# Patient Record
Sex: FEMALE | Race: WHITE | NOT HISPANIC OR LATINO
[De-identification: names, ages, dates, MRNs, and addresses within clinical notes are randomized per-mention and may not be internally consistent; named-entity substitution may affect disease eponyms.]

---

## 2021-09-15 ENCOUNTER — APPOINTMENT (OUTPATIENT)
Dept: PLASTIC SURGERY | Facility: CLINIC | Age: 61
End: 2021-09-15
Payer: SELF-PAY

## 2021-09-15 VITALS
DIASTOLIC BLOOD PRESSURE: 77 MMHG | SYSTOLIC BLOOD PRESSURE: 127 MMHG | HEART RATE: 71 BPM | OXYGEN SATURATION: 100 % | RESPIRATION RATE: 20 BRPM

## 2021-09-15 DIAGNOSIS — Z87.891 PERSONAL HISTORY OF NICOTINE DEPENDENCE: ICD-10-CM

## 2021-09-15 PROBLEM — Z00.00 ENCOUNTER FOR PREVENTIVE HEALTH EXAMINATION: Status: ACTIVE | Noted: 2021-09-15

## 2021-09-15 PROCEDURE — 99203 OFFICE O/P NEW LOW 30 MIN: CPT | Mod: NC

## 2021-09-15 RX ORDER — ZOLPIDEM TARTRATE 5 MG/1
TABLET, FILM COATED ORAL
Refills: 0 | Status: ACTIVE | COMMUNITY

## 2021-09-15 NOTE — HISTORY OF PRESENT ILLNESS
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  Generally healthy and non smoker- Recently had lower facelift (elsewhere) and did well.  Now bothered by skin excess in the upper and lower eyelids.

## 2021-09-15 NOTE — REASON FOR VISIT
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

## 2022-01-03 RX ORDER — ALPRAZOLAM 0.25 MG/1
0.25 TABLET ORAL
Qty: 2 | Refills: 0 | Status: ACTIVE | COMMUNITY
Start: 2022-01-03 | End: 1900-01-01

## 2022-01-03 RX ORDER — TRAMADOL HYDROCHLORIDE AND ACETAMINOPHEN 37.5; 325 MG/1; MG/1
37.5-325 TABLET, FILM COATED ORAL
Qty: 10 | Refills: 0 | Status: ACTIVE | COMMUNITY
Start: 2022-01-03 | End: 1900-01-01

## 2022-01-03 RX ORDER — LEVOFLOXACIN 500 MG/1
500 TABLET, FILM COATED ORAL DAILY
Qty: 3 | Refills: 0 | Status: ACTIVE | COMMUNITY
Start: 2022-01-03 | End: 1900-01-01

## 2022-01-11 ENCOUNTER — APPOINTMENT (OUTPATIENT)
Dept: PLASTIC SURGERY | Facility: HOSPITAL | Age: 62
End: 2022-01-11

## 2022-01-28 ENCOUNTER — APPOINTMENT (OUTPATIENT)
Dept: PLASTIC SURGERY | Facility: CLINIC | Age: 62
End: 2022-01-28
Payer: SELF-PAY

## 2022-01-28 VITALS
DIASTOLIC BLOOD PRESSURE: 61 MMHG | OXYGEN SATURATION: 96 % | SYSTOLIC BLOOD PRESSURE: 106 MMHG | RESPIRATION RATE: 16 BRPM | HEART RATE: 68 BPM

## 2022-01-28 VITALS
SYSTOLIC BLOOD PRESSURE: 116 MMHG | RESPIRATION RATE: 18 BRPM | HEART RATE: 69 BPM | TEMPERATURE: 98.7 F | OXYGEN SATURATION: 97 % | DIASTOLIC BLOOD PRESSURE: 65 MMHG

## 2022-01-28 VITALS
OXYGEN SATURATION: 99 % | RESPIRATION RATE: 18 BRPM | HEART RATE: 83 BPM | SYSTOLIC BLOOD PRESSURE: 96 MMHG | DIASTOLIC BLOOD PRESSURE: 53 MMHG

## 2022-01-28 VITALS
DIASTOLIC BLOOD PRESSURE: 53 MMHG | SYSTOLIC BLOOD PRESSURE: 96 MMHG | HEART RATE: 59 BPM | RESPIRATION RATE: 18 BRPM | OXYGEN SATURATION: 98 %

## 2022-01-28 VITALS
HEART RATE: 70 BPM | SYSTOLIC BLOOD PRESSURE: 101 MMHG | OXYGEN SATURATION: 97 % | RESPIRATION RATE: 16 BRPM | DIASTOLIC BLOOD PRESSURE: 55 MMHG

## 2022-01-28 PROCEDURE — 15820 BLEPHAROPLASTY LOWER EYELID: CPT | Mod: RT,LT

## 2022-01-28 PROCEDURE — 15822 BLEPHAROPLASTY UPPER EYELID: CPT | Mod: LT,RT

## 2022-01-28 NOTE — PROCEDURE
[FreeTextEntry1] : Dermatochalasia of upper and lower eyelids [FreeTextEntry2] : Bilateral upper and lower blepharoplasty  [FreeTextEntry3] : Xylocaine with epinephrine  [FreeTextEntry4] : 20 cc [FreeTextEntry5] : none  [FreeTextEntry6] : Following sterile prep and drape, upper lid incisions in the tarsal crease and planned skin resection marked out, and in the lower lids subciliary incisions and planned excisions were marked out as well.  RIght upper lid skin ellipse was excised and hemostasis assured with cautery.  Excess skin was excised and hemostasis assured, and wound closed with interrupted and running nylon, and same done on the left upper lid.  Left subciliary incision was made and skin muscle flaps elevated to the orbital rim level.  Hemostasis was assured and skin excess excised and incision closed with running and interrupted nylon sutures, and same down to the right loser lid.  Good shape and contour noted and eyes washed with balanced saline solution and patient monitored for one hour prior to discharge home in good condition.   [FreeTextEntry7] : none

## 2022-01-28 NOTE — REASON FOR VISIT
[Procedure: _________] : a [unfilled] procedure visit [FreeTextEntry1] : Patient returns for bilateral upper and lower blepharoplasty

## 2022-01-28 NOTE — ASSESSMENT
[FreeTextEntry1] : A:\par Dermatochalasia of upper and lower eyelids\par P:\par Bilateral upper and lower blepharoplasty\par Tolerated well \par instructions reviewed

## 2022-01-31 ENCOUNTER — APPOINTMENT (OUTPATIENT)
Dept: PLASTIC SURGERY | Facility: CLINIC | Age: 62
End: 2022-01-31
Payer: SELF-PAY

## 2022-01-31 VITALS
OXYGEN SATURATION: 100 % | TEMPERATURE: 98.5 F | SYSTOLIC BLOOD PRESSURE: 120 MMHG | RESPIRATION RATE: 18 BRPM | DIASTOLIC BLOOD PRESSURE: 68 MMHG | HEART RATE: 74 BPM

## 2022-01-31 PROCEDURE — 99024 POSTOP FOLLOW-UP VISIT: CPT

## 2022-01-31 RX ORDER — KETOROLAC TROMETHAMINE 5 MG/ML
0.5 SOLUTION OPHTHALMIC
Qty: 5 | Refills: 0 | Status: ACTIVE | COMMUNITY
Start: 2022-01-31 | End: 1900-01-01

## 2022-01-31 NOTE — REASON FOR VISIT
[Follow-Up: _____] : a [unfilled] follow-up visit [FreeTextEntry1] : Ms. DELIA SILVERMAN  returns for suture removal, generally doing well with no complaints and no fevers or chills at home, edema and ecchymosis present but improving

## 2022-01-31 NOTE — ASSESSMENT
[FreeTextEntry1] : A:\par Doing well post operative\par P:\par Running sutures removed\par instructions reviewed

## 2022-02-03 ENCOUNTER — APPOINTMENT (OUTPATIENT)
Dept: PLASTIC SURGERY | Facility: CLINIC | Age: 62
End: 2022-02-03
Payer: SELF-PAY

## 2022-02-03 PROCEDURE — 99024 POSTOP FOLLOW-UP VISIT: CPT

## 2022-02-03 NOTE — PHYSICAL EXAM
[de-identified] : Shape and contour are good\par incisions are clean and intact \par edema lower lids

## 2022-02-03 NOTE — ASSESSMENT
[FreeTextEntry1] : A:\par Doing well post operative\par P:\par Typical edema at six days \par Remaining sutures removed\par scar care reviewed \par

## 2022-02-03 NOTE — REASON FOR VISIT
[Post Op: _________] : a [unfilled] post op visit [FreeTextEntry1] : Ms. DELIA SILVERMAN  returns for suture removal, generally doing well with no complaints and no fevers or chills at home.  worried by lateral lowered eyelid

## 2022-02-14 ENCOUNTER — APPOINTMENT (OUTPATIENT)
Dept: PLASTIC SURGERY | Facility: CLINIC | Age: 62
End: 2022-02-14
Payer: SELF-PAY

## 2022-02-14 PROCEDURE — 99024 POSTOP FOLLOW-UP VISIT: CPT

## 2022-02-14 RX ORDER — KETOROLAC TROMETHAMINE 4 MG/ML
0.4 SOLUTION/ DROPS OPHTHALMIC 4 TIMES DAILY
Qty: 1 | Refills: 0 | Status: ACTIVE | COMMUNITY
Start: 2022-02-14 | End: 1900-01-01

## 2022-02-14 NOTE — REASON FOR VISIT
[Post Op: _________] : a [unfilled] post op visit [FreeTextEntry1] : Patient very concerned about edema right lateral eyelid with scleral show

## 2022-02-14 NOTE — ASSESSMENT
[FreeTextEntry1] : A:\par Post operative healing with edema\par Should resolve with time\par P:\par Acular for inflammation\par massage for edema

## 2022-02-14 NOTE — PHYSICAL EXAM
[NI] : Normal [de-identified] : left healing well\par right edema laterally with right lateral scleral show and "pull" on lateral upper lid

## 2022-03-02 ENCOUNTER — APPOINTMENT (OUTPATIENT)
Dept: PLASTIC SURGERY | Facility: CLINIC | Age: 62
End: 2022-03-02
Payer: SELF-PAY

## 2022-03-02 VITALS
SYSTOLIC BLOOD PRESSURE: 144 MMHG | HEART RATE: 77 BPM | DIASTOLIC BLOOD PRESSURE: 68 MMHG | RESPIRATION RATE: 18 BRPM | OXYGEN SATURATION: 98 % | TEMPERATURE: 98.2 F

## 2022-03-02 PROCEDURE — 99024 POSTOP FOLLOW-UP VISIT: CPT

## 2022-03-02 NOTE — REASON FOR VISIT
[Post Op: _________] : a [unfilled] post op visit [FreeTextEntry1] : Patient with right lower lateral chemosis with scleral show

## 2022-03-02 NOTE — ASSESSMENT
[FreeTextEntry1] : A:\par Discussed at length with the patient\par right lateral scleral show with edema and underlying scar\par P:\par This will improve with time and continued conservative management, but as it heals temporary improvement with Kenalog injection to the firm area and 0.2 cc filler (Restylane lift) to the right pre malar area for elevation.  Reviewed the material risks,  benefits,  and alternatives with Ms. DELIA SILVERMAN  , including no surgery, and she  understands and wishes to proceed.\par Patient pre treated with Quadricaine and received 2 mg Kenalog and 0.2 cc Restylane\par Tolerated well\par Will call next week to monitor progress, and repeat injection as indicated

## 2022-03-02 NOTE — PHYSICAL EXAM
[de-identified] : left lower lid edema\par shape and contour improved\par right lateral lower chemosis with scleral show, pull on right lateral upper eyelid \par Firm area under the right lateral lower eyelid 
119.9

## 2022-03-14 ENCOUNTER — APPOINTMENT (OUTPATIENT)
Dept: PLASTIC SURGERY | Facility: CLINIC | Age: 62
End: 2022-03-14
Payer: SELF-PAY

## 2022-03-14 VITALS — HEART RATE: 81 BPM | SYSTOLIC BLOOD PRESSURE: 140 MMHG | DIASTOLIC BLOOD PRESSURE: 68 MMHG

## 2022-03-14 PROCEDURE — 64612 DESTROY NERVE FACE MUSCLE: CPT

## 2022-03-14 PROCEDURE — 99024 POSTOP FOLLOW-UP VISIT: CPT

## 2022-03-14 NOTE — PROCEDURE
[FreeTextEntry1] : Delores orbital rhytids, pre malar hollow  [FreeTextEntry2] : Dysport to forehead and glabella, Restylane to pre malar area [FreeTextEntry3] : none  [FreeTextEntry4] : none  [FreeTextEntry5] : none  [FreeTextEntry6] : Dysport injected in forehead,and glabellar frown lines \par Restylane to pre malar area \par Tolerated well  [FreeTextEntry7] : none

## 2022-03-14 NOTE — HISTORY OF PRESENT ILLNESS
[FreeTextEntry1] : Concerns relate to asymmetry upper eyelid incisions, continued lateral lower lid edema with scleral show, cyst left lateral lower lid incision, and residual rhytids left cheek

## 2022-03-14 NOTE — REASON FOR VISIT
[Post Op: _________] : a [unfilled] post op visit [FreeTextEntry1] : Ms. DELIA SILVERMAN returns for a follow up visit, generally doing well with no complaints and no fevers or chills at home, less right lateral edema and scleral show post Kenalog and massage -

## 2022-03-14 NOTE — PHYSICAL EXAM
[NI] : Normal [de-identified] : right edema resolving, better symmetry than previous visit\par edema with ecchymosis left lower lid\par left lower lid cyst, lateral incision\par bilateral upper lid incisions in tarsal crease \par

## 2022-03-14 NOTE — ASSESSMENT
[FreeTextEntry1] : A:\par Delores orbital rhytids\par pre malar hollow area \par P:\par Dysport to forehead and glabella, Restylane to pre malar area\par Tolerated well \par instructions reviewed

## 2022-03-14 NOTE — ASSESSMENT
[FreeTextEntry1] : A:\par Resolving right lower lid edema with scleral show\par left cyst\par P:\par 0.2 kenalog injected in right lateral lid \par left cystic lesion opened and partially drained\par Continue massage\par

## 2022-03-14 NOTE — REASON FOR VISIT
[Procedure: _________] : a [unfilled] procedure visit [FreeTextEntry1] : Patient presents for treatment of fei orbital rhytids with Dysport, pre malar area with Restylane

## 2022-03-23 ENCOUNTER — APPOINTMENT (OUTPATIENT)
Dept: PLASTIC SURGERY | Facility: CLINIC | Age: 62
End: 2022-03-23
Payer: SELF-PAY

## 2022-03-23 VITALS — HEART RATE: 84 BPM | SYSTOLIC BLOOD PRESSURE: 130 MMHG | RESPIRATION RATE: 20 BRPM | DIASTOLIC BLOOD PRESSURE: 77 MMHG

## 2022-03-23 PROCEDURE — 99024 POSTOP FOLLOW-UP VISIT: CPT | Mod: NC

## 2022-03-23 RX ORDER — LOTEPREDNOL ETABONATE 5 MG/ML
0.5 SUSPENSION/ DROPS OPHTHALMIC 3 TIMES DAILY
Qty: 1 | Refills: 0 | Status: ACTIVE | COMMUNITY
Start: 2022-03-23 | End: 1900-01-01

## 2022-03-23 NOTE — PHYSICAL EXAM
[NI] : Normal [de-identified] : still edematous right lower lid, but softer\par left lateral scar with small cyst\par ecchymosis and edema present

## 2022-03-23 NOTE — ASSESSMENT
[FreeTextEntry1] : A:\par Slow improvement\par P:\par Steroid drops both sides\par drain ed cyst, soaks at home

## 2022-03-31 ENCOUNTER — APPOINTMENT (OUTPATIENT)
Dept: PLASTIC SURGERY | Facility: CLINIC | Age: 62
End: 2022-03-31
Payer: SELF-PAY

## 2022-03-31 PROCEDURE — 99024 POSTOP FOLLOW-UP VISIT: CPT

## 2022-03-31 NOTE — ASSESSMENT
[FreeTextEntry1] : A:\par Continued improvement\par P:\par Low dose steroid on left for inflammation\par Continue drops

## 2022-03-31 NOTE — REASON FOR VISIT
[Follow-Up: _____] : a [unfilled] follow-up visit [FreeTextEntry1] : Ms. DELIA SILVERMAN returns for a follow up visit, generally doing well with no complaints and no fevers or chills at home, ectropion improved- edema on left

## 2022-03-31 NOTE — PHYSICAL EXAM
[NI] : Normal [de-identified] : Shape and contour are improving\par right ectropion resolving\par left edema present \par

## 2022-04-27 ENCOUNTER — APPOINTMENT (OUTPATIENT)
Dept: PLASTIC SURGERY | Facility: CLINIC | Age: 62
End: 2022-04-27
Payer: SELF-PAY

## 2022-04-27 PROCEDURE — 99024 POSTOP FOLLOW-UP VISIT: CPT | Mod: NC

## 2022-04-28 NOTE — REASON FOR VISIT
[Follow-Up: _____] : a [unfilled] follow-up visit [FreeTextEntry1] : 3 months post bilateral upper lower blepharoplasty with ectropion resolved

## 2022-04-28 NOTE — PHYSICAL EXAM
[de-identified] : Ectropion resolved\par right upper lid incision lower than the left \par residual laxity left lower lid skin \par central laxity right lower lid \par

## 2022-04-28 NOTE — HISTORY OF PRESENT ILLNESS
[FreeTextEntry1] : Patient concerns include\par central laxity right lower lid\par residual laxity left lower lid \par Asymmetry upper lid incision on the right side

## 2022-05-25 RX ORDER — ALPRAZOLAM 0.25 MG/1
0.25 TABLET ORAL
Qty: 2 | Refills: 0 | Status: ACTIVE | COMMUNITY
Start: 2022-05-25 | End: 1900-01-01

## 2022-05-25 RX ORDER — OXYCODONE 5 MG/1
5 TABLET ORAL
Qty: 10 | Refills: 0 | Status: ACTIVE | COMMUNITY
Start: 2022-05-25 | End: 1900-01-01

## 2022-05-25 RX ORDER — LEVOFLOXACIN 500 MG/1
500 TABLET, FILM COATED ORAL DAILY
Qty: 3 | Refills: 0 | Status: ACTIVE | COMMUNITY
Start: 2022-05-25 | End: 1900-01-01

## 2022-06-10 ENCOUNTER — APPOINTMENT (OUTPATIENT)
Dept: PLASTIC SURGERY | Facility: CLINIC | Age: 62
End: 2022-06-10
Payer: SELF-PAY

## 2022-06-10 VITALS
DIASTOLIC BLOOD PRESSURE: 72 MMHG | BODY MASS INDEX: 20.22 KG/M2 | HEART RATE: 70 BPM | SYSTOLIC BLOOD PRESSURE: 127 MMHG | WEIGHT: 103 LBS | TEMPERATURE: 97.9 F | HEIGHT: 60 IN | OXYGEN SATURATION: 98 %

## 2022-06-10 VITALS — HEART RATE: 53 BPM | DIASTOLIC BLOOD PRESSURE: 65 MMHG | OXYGEN SATURATION: 100 % | SYSTOLIC BLOOD PRESSURE: 106 MMHG

## 2022-06-10 PROCEDURE — 15820 BLEPHAROPLASTY LOWER EYELID: CPT | Mod: NC,LT,RT

## 2022-06-10 NOTE — PROCEDURE
[FreeTextEntry1] : Patient with skin excess lower lids and right upper lid scar contracture  [FreeTextEntry2] : Bilateral lower eyelids blepharoplasty skin excision, right lateral upper eyelid scar revision  [FreeTextEntry3] : Xylocaine 1% with epinephrine  [FreeTextEntry4] : 20 cc [FreeTextEntry5] : none  [FreeTextEntry6] : Plan for lower eyelid skin excision on the right central lid and left lid, and lateral right upper scar revision reviewed with patient prior to surgery.  Following sterile prep and drape, planned excisions marked and Xylocaine anesthesia given.  RIght upper lateral scar excised and hemostasis was assured.  Incision closed with nylon sutures and left lower lid skin excision using prior scar made.  Skin muscle flaps elevated and hemostasis assured.  Medial skin excess removed and incision closed with nylon sutures.  On the left lower lid. the incision was opened and flap elevated.  HEmostasis was assured and skin excision followed by nylon skin closure.  Patient tolerated well  [FreeTextEntry7] : none

## 2022-06-10 NOTE — REASON FOR VISIT
[Procedure: _________] : a [unfilled] procedure visit [FreeTextEntry1] : Patient returs for secondary eyelid surgery

## 2022-06-10 NOTE — ASSESSMENT
[FreeTextEntry1] : A:\par Lower eyelid skin excess and upper right lateral scar contracture\par P:\par Lower lid blepharoplasty skin excision\par scar revision right upper lateral eyelid\par tolerated well \par Instructions reviewed.\par Home in good condition with follow up appointment for 6/15/ given

## 2022-06-15 ENCOUNTER — APPOINTMENT (OUTPATIENT)
Dept: PLASTIC SURGERY | Facility: CLINIC | Age: 62
End: 2022-06-15
Payer: SELF-PAY

## 2022-06-15 VITALS
OXYGEN SATURATION: 98 % | RESPIRATION RATE: 20 BRPM | HEART RATE: 57 BPM | DIASTOLIC BLOOD PRESSURE: 84 MMHG | TEMPERATURE: 97.8 F | SYSTOLIC BLOOD PRESSURE: 124 MMHG

## 2022-06-15 PROCEDURE — 99024 POSTOP FOLLOW-UP VISIT: CPT

## 2022-06-15 NOTE — REASON FOR VISIT
[Post Op: _________] : a [unfilled] post op visit [FreeTextEntry1] : Ms. DELIA SILVERMAN  returns for suture removal, generally doing well with no complaints and no fevers or chills at home.

## 2022-06-15 NOTE — ASSESSMENT
[FreeTextEntry1] : A:\par DOing well following lower blepharoplasty\par P;\par Sutures removed and care reviewed

## 2022-06-16 RX ORDER — LOTEPREDNOL ETABONATE 5 MG/ML
0.5 SUSPENSION/ DROPS OPHTHALMIC 3 TIMES DAILY
Qty: 1 | Refills: 0 | Status: ACTIVE | COMMUNITY
Start: 2022-06-16 | End: 1900-01-01

## 2022-06-21 ENCOUNTER — APPOINTMENT (OUTPATIENT)
Dept: PLASTIC SURGERY | Facility: CLINIC | Age: 62
End: 2022-06-21
Payer: SELF-PAY

## 2022-06-21 VITALS
HEART RATE: 56 BPM | RESPIRATION RATE: 20 BRPM | SYSTOLIC BLOOD PRESSURE: 119 MMHG | OXYGEN SATURATION: 100 % | TEMPERATURE: 98.1 F | DIASTOLIC BLOOD PRESSURE: 67 MMHG

## 2022-06-21 PROCEDURE — 99024 POSTOP FOLLOW-UP VISIT: CPT

## 2022-06-21 NOTE — PHYSICAL EXAM
[NI] : Normal [de-identified] : Right healing well\par left lower edema with lateral scleral show

## 2022-06-21 NOTE — REASON FOR VISIT
[Post Op: _________] : a [unfilled] post op visit [FreeTextEntry1] : Ms. DELIA SILVERMAN returns for a follow up visit, generally doing well with no complaints and no fevers or chills at home, on drops for left lower lid edema

## 2022-06-21 NOTE — ASSESSMENT
[FreeTextEntry1] : A:\par Healing well\par P:\par COntinue drops\par demonstrated massage three times daily

## 2022-06-27 ENCOUNTER — APPOINTMENT (OUTPATIENT)
Dept: PLASTIC SURGERY | Facility: CLINIC | Age: 62
End: 2022-06-27
Payer: SELF-PAY

## 2022-06-27 PROCEDURE — 99024 POSTOP FOLLOW-UP VISIT: CPT

## 2022-06-27 RX ORDER — KETOROLAC TROMETHAMINE 4 MG/ML
0.4 SOLUTION/ DROPS OPHTHALMIC 4 TIMES DAILY
Qty: 1 | Refills: 0 | Status: ACTIVE | COMMUNITY
Start: 2022-06-27 | End: 1900-01-01

## 2022-06-27 NOTE — ASSESSMENT
[FreeTextEntry1] : A:\par Post operative edema left lower lid\par P:\par 5 mg Kenalog injected in sterile fashion\par Tolerated well

## 2022-06-27 NOTE — REASON FOR VISIT
[Post Op: _________] : a [unfilled] post op visit [FreeTextEntry1] : edema left lower lid with scleral show

## 2022-06-27 NOTE — PHYSICAL EXAM
[NI] : Normal [de-identified] : edema left lower lateral lid with scleral show\par incisions cleana dn intact

## 2022-06-29 RX ORDER — SULFAMETHOXAZOLE AND TRIMETHOPRIM 800; 160 MG/1; MG/1
800-160 TABLET ORAL TWICE DAILY
Qty: 14 | Refills: 0 | Status: ACTIVE | COMMUNITY
Start: 2022-06-29 | End: 1900-01-01

## 2022-07-06 ENCOUNTER — APPOINTMENT (OUTPATIENT)
Dept: PLASTIC SURGERY | Facility: CLINIC | Age: 62
End: 2022-07-06

## 2022-07-06 DIAGNOSIS — H02.112 CICATRICIAL ECTROPION OF RIGHT LOWER EYELID: ICD-10-CM

## 2022-07-06 PROCEDURE — 99024 POSTOP FOLLOW-UP VISIT: CPT

## 2022-07-06 NOTE — REASON FOR VISIT
[Follow-Up: _____] : a [unfilled] follow-up visit [FreeTextEntry1] : redness and pain resolved with antibiotic, edema nd scleral show left lateral improved

## 2022-07-06 NOTE — PHYSICAL EXAM
[de-identified] : still edematous with scleral show on left and ecchymosis over inferior orbital rim\par though shape and contour are improved

## 2022-07-06 NOTE — ASSESSMENT
[FreeTextEntry1] : A:\par Infection resolved\par cicatricial ectropion improving\par P:\par FInish antibiotic\par Kenalog 5 mg \par

## 2022-07-08 RX ORDER — SULFAMETHOXAZOLE AND TRIMETHOPRIM 800; 160 MG/1; MG/1
800-160 TABLET ORAL TWICE DAILY
Qty: 14 | Refills: 0 | Status: ACTIVE | COMMUNITY
Start: 2022-07-08 | End: 1900-01-01

## 2022-07-13 ENCOUNTER — APPOINTMENT (OUTPATIENT)
Dept: PLASTIC SURGERY | Facility: CLINIC | Age: 62
End: 2022-07-13

## 2022-07-13 PROCEDURE — 99024 POSTOP FOLLOW-UP VISIT: CPT

## 2022-07-14 NOTE — PHYSICAL EXAM
[de-identified] : SHape and contour are good\par left scleral show resolved\par no erythema or discharge

## 2022-07-14 NOTE — REASON FOR VISIT
[Follow-Up: _____] : a [unfilled] follow-up visit [FreeTextEntry1] : Ms. DELIA SILVERMAN returns for a follow up visit, generally doing well with no complaints and no fevers or chills at home, notes improved eyelid position, still concerns related to swelling

## 2022-07-27 ENCOUNTER — APPOINTMENT (OUTPATIENT)
Dept: PLASTIC SURGERY | Facility: CLINIC | Age: 62
End: 2022-07-27

## 2022-07-27 PROCEDURE — 64612 DESTROY NERVE FACE MUSCLE: CPT

## 2022-07-27 NOTE — REASON FOR VISIT
[Procedure: _________] : a [unfilled] procedure visit [FreeTextEntry1] : Patient for fei orbital dysport- hold on Crow's feet with lateral left scleral show

## 2022-07-27 NOTE — PROCEDURE
[FreeTextEntry1] : Delores orbital rhytids [FreeTextEntry2] : Dysport to forehead and glabella  [FreeTextEntry3] : none  [FreeTextEntry4] : minimal  [FreeTextEntry5] : none  [FreeTextEntry6] : Twenty units Dysport injected in the forehead and 20 units in the glabella\par tolerated well  [FreeTextEntry7] : none

## 2022-07-27 NOTE — ASSESSMENT
[FreeTextEntry1] : A:\par Delores orbital rhytids\par P;\par Dysport to forehead and glabella\par tolerated well \par instructions reviewed

## 2022-08-31 ENCOUNTER — APPOINTMENT (OUTPATIENT)
Dept: PLASTIC SURGERY | Facility: CLINIC | Age: 62
End: 2022-08-31

## 2022-08-31 DIAGNOSIS — Z41.1 ENCOUNTER FOR COSMETIC SURGERY: ICD-10-CM

## 2022-08-31 PROCEDURE — 99024 POSTOP FOLLOW-UP VISIT: CPT | Mod: NC

## 2022-08-31 NOTE — REASON FOR VISIT
[Follow-Up: _____] : a [unfilled] follow-up visit [FreeTextEntry1] : Patient very unhappy with the results of her surgery

## 2022-08-31 NOTE — HISTORY OF PRESENT ILLNESS
[FreeTextEntry1] : Patient walked out of the consultation extremely angry, expressing feeling "dismissed".\par She pointed out her concerns regarding the result including \par Asymmetry of the upper eyelids\par discoloration of the cheeks, left more than the right \par left lateral lower lid ectropion, left different than the right \par "pimple" on right mid lower lid\par  \par I had her pre op photos which I wanted to use to show that she started with upper eyelid asymmetry which is better now than pre operative, and that the lower lid "bags " were improved, but she didn't want to look at the photos\par \par I suggested seeking another opinion to look for new ideas on how to improve her situation, but she seemed to feel that meant I was dismissing her and she got angry and left.  \par \par
